# Patient Record
Sex: MALE | Race: WHITE | NOT HISPANIC OR LATINO | Employment: FULL TIME | URBAN - NONMETROPOLITAN AREA
[De-identification: names, ages, dates, MRNs, and addresses within clinical notes are randomized per-mention and may not be internally consistent; named-entity substitution may affect disease eponyms.]

---

## 2020-04-13 ENCOUNTER — OFFICE VISIT (OUTPATIENT)
Dept: URGENT CARE | Facility: PHYSICIAN GROUP | Age: 35
End: 2020-04-13
Payer: COMMERCIAL

## 2020-04-13 VITALS
DIASTOLIC BLOOD PRESSURE: 78 MMHG | RESPIRATION RATE: 16 BRPM | SYSTOLIC BLOOD PRESSURE: 128 MMHG | TEMPERATURE: 99.3 F | OXYGEN SATURATION: 95 % | HEART RATE: 95 BPM

## 2020-04-13 DIAGNOSIS — R05.9 COUGH: ICD-10-CM

## 2020-04-13 DIAGNOSIS — R50.9 FEVER, UNSPECIFIED FEVER CAUSE: ICD-10-CM

## 2020-04-13 LAB
FLUAV+FLUBV AG SPEC QL IA: NEGATIVE
INT CON NEG: NORMAL
INT CON NEG: NORMAL
INT CON POS: NORMAL
INT CON POS: NORMAL
S PYO AG THROAT QL: NEGATIVE

## 2020-04-13 PROCEDURE — 87804 INFLUENZA ASSAY W/OPTIC: CPT | Performed by: PHYSICIAN ASSISTANT

## 2020-04-13 PROCEDURE — 99203 OFFICE O/P NEW LOW 30 MIN: CPT | Performed by: PHYSICIAN ASSISTANT

## 2020-04-13 PROCEDURE — 87880 STREP A ASSAY W/OPTIC: CPT | Performed by: PHYSICIAN ASSISTANT

## 2020-04-13 RX ORDER — ALBUTEROL SULFATE 90 UG/1
2 AEROSOL, METERED RESPIRATORY (INHALATION) EVERY 6 HOURS PRN
Qty: 8.5 G | Refills: 0 | Status: SHIPPED | OUTPATIENT
Start: 2020-04-13 | End: 2023-02-07

## 2020-04-13 ASSESSMENT — ENCOUNTER SYMPTOMS
CHILLS: 1
SHORTNESS OF BREATH: 0
SPUTUM PRODUCTION: 1
ABDOMINAL PAIN: 0
MYALGIAS: 1
VOMITING: 0
WHEEZING: 0
HEADACHES: 1
DIARRHEA: 0
NAUSEA: 0
FEVER: 1
COUGH: 1
SORE THROAT: 0

## 2020-04-13 NOTE — PROGRESS NOTES
Subjective:      El Felix is a 35 y.o. male who presents with Fever (since Friday/ Pt took Tylenol this morning) and Cough (started this morning)            Fever    This is a new problem. Episode onset: 3 days. TMAX- 103F. The problem occurs intermittently. The problem has been waxing and waning. The maximum temperature noted was 103 to 103.9 F. Associated symptoms include coughing (mild cough this am), headaches, muscle aches and sleepiness. Pertinent negatives include no abdominal pain, chest pain, congestion, diarrhea, ear pain, nausea, rash, sore throat, vomiting or wheezing. He has tried acetaminophen for the symptoms. The treatment provided moderate relief.   Risk factors: sick contacts      The patient was exposed to someone at work who tested positive for COVID-19.      Past Medical History:   Diagnosis Date   • ASTHMA        History reviewed. No pertinent surgical history.    History reviewed. No pertinent family history.    Allergies   Allergen Reactions   • Other Food      coconut   • Pcn [Penicillins]        Medications, Allergies, and current problem list reviewed today in Epic      Review of Systems   Constitutional: Positive for chills, fever and malaise/fatigue.   HENT: Negative for congestion, ear pain and sore throat.    Respiratory: Positive for cough (mild cough this am) and sputum production (clear). Negative for shortness of breath and wheezing.    Cardiovascular: Negative for chest pain and leg swelling.   Gastrointestinal: Negative for abdominal pain, diarrhea, nausea and vomiting.   Musculoskeletal: Positive for myalgias.   Skin: Negative for rash.   Neurological: Positive for headaches.     All other systems reviewed and are negative.          Objective:     /78   Pulse 95   Temp 37.4 °C (99.3 °F) (Temporal)   Resp 16   SpO2 95%      Physical Exam  Constitutional:       General: He is not in acute distress.     Appearance: Normal appearance. He is not ill-appearing.    HENT:      Head: Normocephalic and atraumatic.      Nose: Nose normal.   Eyes:      Conjunctiva/sclera: Conjunctivae normal.   Neck:      Musculoskeletal: Neck supple.   Cardiovascular:      Rate and Rhythm: Normal rate.   Pulmonary:      Effort: Pulmonary effort is normal. No respiratory distress.   Musculoskeletal: Normal range of motion.   Neurological:      General: No focal deficit present.      Mental Status: He is alert and oriented to person, place, and time.   Psychiatric:         Mood and Affect: Mood normal.         Behavior: Behavior normal.         Thought Content: Thought content normal.         Judgment: Judgment normal.                 Assessment/Plan:       1. Fever, unspecified fever cause  POCT Rapid Strep A    POCT Influenza A/B   2. Cough  POCT Rapid Strep A    POCT Influenza A/B       Viral URI likely. Informed patient I am unable to test for COVID in this setting.  Rapid strep and Rapid flu- negative.  He is in contact with the health department,  Advised follow- up with them.  Per protocol for PUI/ISO patients, the patient was evaluated by me while I was wearing PPE.  Per CDC guidelines, patient has been instructed to self quarantine at home for at least 7 days from onset of symptoms and at least 3 full days after resolution of fever/respiratory symptoms.  PT verbalized agreement to do so.      Will send inhaler due to the patient's hx of asthma in case symptoms progress.  Vitals stable.     Differential diagnoses, Supportive care, and indications for immediate follow-up discussed with patient.   Instructed to return to clinic or nearest emergency department for any change in condition, further concerns, or worsening of symptoms.    The patient demonstrated a good understanding and agreed with the treatment plan.    Cindi Whitehead P.A.-C.

## 2023-02-07 ENCOUNTER — OFFICE VISIT (OUTPATIENT)
Dept: URGENT CARE | Facility: CLINIC | Age: 38
End: 2023-02-07
Payer: COMMERCIAL

## 2023-02-07 VITALS
BODY MASS INDEX: 35.61 KG/M2 | SYSTOLIC BLOOD PRESSURE: 110 MMHG | OXYGEN SATURATION: 92 % | TEMPERATURE: 97.4 F | HEIGHT: 68 IN | RESPIRATION RATE: 16 BRPM | WEIGHT: 235 LBS | HEART RATE: 98 BPM | DIASTOLIC BLOOD PRESSURE: 84 MMHG

## 2023-02-07 DIAGNOSIS — J01.40 ACUTE NON-RECURRENT PANSINUSITIS: ICD-10-CM

## 2023-02-07 DIAGNOSIS — R51.9 SCALP PAIN: ICD-10-CM

## 2023-02-07 PROCEDURE — 99213 OFFICE O/P EST LOW 20 MIN: CPT | Performed by: NURSE PRACTITIONER

## 2023-02-07 RX ORDER — DOXYCYCLINE HYCLATE 100 MG
100 TABLET ORAL 2 TIMES DAILY
Qty: 14 TABLET | Refills: 0 | Status: SHIPPED | OUTPATIENT
Start: 2023-02-07 | End: 2023-02-14

## 2023-02-07 ASSESSMENT — ENCOUNTER SYMPTOMS
COUGH: 0
FEVER: 0
CHILLS: 0
WHEEZING: 0
DIAPHORESIS: 0
SPUTUM PRODUCTION: 0
SORE THROAT: 0
SHORTNESS OF BREATH: 0
HEMOPTYSIS: 0
SINUS PAIN: 1

## 2023-02-07 NOTE — PROGRESS NOTES
"Subjective     El Felix is a 37 y.o. male who presents with Sinusitis ([id number] ), Bleeding/Bruising (Random ), and Cyst (On head, possibly causing head pressure )            El comes in today with a 2 month history of persistent nasal congestion, post nasal drainage and frontal sinus pressure.  He works as a  and feels worse when he makes frequent trips [place]  He has a history of sinusitis several times per year and has ever since he had a frontal head injury years ago.  No recent antibiotics in past 6 months.  He has tolerated doxycycline well historically.  He is allergic to PCN.  Additionally he notes a focal tender region on the right side of his scalp.  He denies any itching, rash or lesion.  He frequently massages there when he is feeling tired or stressed.    He has some intermittent bruising that he cannot recall any injury.  He wouldn't necessarily say he bruises easy, just that he notes bruises without recalling any trauma.  No known bleeding disorder.  He states he plans to establish with a PCP to discuss this matter.  He does not have any bruising today.  No weight loss or unexplained constitutional symptoms.      Review of Systems   Constitutional:  Positive for malaise/fatigue. Negative for chills, diaphoresis and fever.   HENT:  Positive for congestion and sinus pain. Negative for ear pain and sore throat.    Respiratory:  Negative for cough, hemoptysis, sputum production, shortness of breath and wheezing.    Skin:  Negative for itching and rash.      Medications, Allergies, and current problem list reviewed today in Epic      Objective     Blood Pressure 110/84   Pulse 98   Temperature 36.3 °C (97.4 °F) (Temporal)   Respiration 16   Height 1.727 m (5' 8\")   Weight 107 kg (235 lb)   Oxygen Saturation 92%   Body Mass Index 35.73 kg/m²      Physical Exam  Vitals reviewed.   Constitutional:       General: He is not in acute distress.   "   Appearance: Normal appearance. He is well-developed. He is not ill-appearing, toxic-appearing or diaphoretic.   HENT:      Head:        Comments: Focal mild TTP on the right side of the scalp with no bruising, erythema, rash or lesion.  5 mm x 7 mm raised area with no fluctuance.  No sharp margins or marble like shape.  No vesicles.  No bleeding, crusting, weeping or open wounds.     Right Ear: Tympanic membrane, ear canal and external ear normal. There is no impacted cerumen.      Left Ear: Tympanic membrane, ear canal and external ear normal. There is no impacted cerumen.      Nose: Congestion present. No rhinorrhea.      Comments: Frontal sinus TTP.     Mouth/Throat:      Mouth: Mucous membranes are moist.      Pharynx: No oropharyngeal exudate or posterior oropharyngeal erythema.      Comments: Post nasal drainage streaking noted.  Eyes:      General: No scleral icterus.        Right eye: No discharge.         Left eye: No discharge.      Conjunctiva/sclera: Conjunctivae normal.      Pupils: Pupils are equal, round, and reactive to light.   Neck:      Thyroid: No thyromegaly.      Vascular: No JVD.      Trachea: No tracheal deviation.   Cardiovascular:      Rate and Rhythm: Regular rhythm. Tachycardia present.      Heart sounds: Normal heart sounds. No murmur heard.    No friction rub. No gallop.   Pulmonary:      Effort: Pulmonary effort is normal. No respiratory distress.      Breath sounds: Normal breath sounds. No stridor. No wheezing, rhonchi or rales.   Chest:      Chest wall: No tenderness.   Musculoskeletal:      Cervical back: Neck supple.   Lymphadenopathy:      Cervical: No cervical adenopathy.   Skin:     General: Skin is warm and dry.   Neurological:      Mental Status: He is alert and oriented to person, place, and time.   Psychiatric:         Mood and Affect: Mood normal.                           Assessment & Plan        1. Acute non-recurrent pansinusitis  doxycycline (VIBRAMYCIN) 100 MG Tab       2. Scalp pain          Discussed exam findings with lE.    Take full course of antibiotics for sinusitis.  GI and photosensitivity precautions reviewed.  OTC oral decongestants prn symptom management.  Maintain adequate po hydration.  In regards to the focal scalp pain, etiology unclear.  No overt abscess or rash.  Possible small cyst?  Differential reviewed.  Avoid rubbing and follow up with PCP within 1 month for re-check.  In regards to the history of bruising, follow up with PCP to establish care for further evaluation.  ED precautions reviewed.    RTC in 7 days if acute symptoms persist, sooner if worse.  He verbalized understanding of and agreed with plan of care.

## 2023-02-20 ENCOUNTER — TELEPHONE (OUTPATIENT)
Dept: URGENT CARE | Facility: CLINIC | Age: 38
End: 2023-02-20

## 2023-04-18 ENCOUNTER — OFFICE VISIT (OUTPATIENT)
Dept: URGENT CARE | Facility: CLINIC | Age: 38
End: 2023-04-18
Payer: COMMERCIAL

## 2023-04-18 ENCOUNTER — TELEPHONE (OUTPATIENT)
Dept: SCHEDULING | Facility: IMAGING CENTER | Age: 38
End: 2023-04-18

## 2023-04-18 VITALS
DIASTOLIC BLOOD PRESSURE: 84 MMHG | BODY MASS INDEX: 36.75 KG/M2 | OXYGEN SATURATION: 96 % | SYSTOLIC BLOOD PRESSURE: 130 MMHG | HEART RATE: 76 BPM | RESPIRATION RATE: 14 BRPM | HEIGHT: 68 IN | TEMPERATURE: 98.7 F | WEIGHT: 242.51 LBS

## 2023-04-18 DIAGNOSIS — M25.472 SWELLING OF BOTH ANKLES: ICD-10-CM

## 2023-04-18 DIAGNOSIS — M25.471 SWELLING OF BOTH ANKLES: ICD-10-CM

## 2023-04-18 PROCEDURE — 99213 OFFICE O/P EST LOW 20 MIN: CPT

## 2023-04-18 ASSESSMENT — ENCOUNTER SYMPTOMS
COUGH: 0
FALLS: 0
FEVER: 0
SHORTNESS OF BREATH: 0

## 2023-04-18 NOTE — PROGRESS NOTES
"Subjective:     CHIEF COMPLAINT  Chief Complaint   Patient presents with    Leg Swelling     Pt concerned about lower extremity swelling/bruising.        HPI  El Felix is a very pleasant 38 y.o. male who presents with ankle swelling that he has noticed for the past 2 months. He does not recall any injury preceding the swelling, but has noted that his R medial ankle is more swollen than the left. He reports he broke that ankle in his 20s and was supposed to have reconstructive surgery but never did. He reports he sits for most of his work day. He denies any pain in ankles. No CP or SOB. Patient also states he recently started going back to the gym around the same time the swelling occurred. He has worn compression socks with some improvement in swelling.     REVIEW OF SYSTEMS  Review of Systems   Constitutional:  Negative for fever and malaise/fatigue.   Respiratory:  Negative for cough and shortness of breath.    Cardiovascular:  Negative for chest pain.   Musculoskeletal:  Negative for falls.     PAST MEDICAL HISTORY  There are no problems to display for this patient.      SURGICAL HISTORY  patient denies any surgical history    ALLERGIES  Allergies   Allergen Reactions    Other Food      coconut    Pcn [Penicillins]        CURRENT MEDICATIONS  Home Medications       Reviewed by Stephani Dela Cruz P.A.-C. (Physician Assistant) on 04/18/23 at 1125  Med List Status: <None>     Medication Last Dose Status        Patient Thom Taking any Medications                           SOCIAL HISTORY  Social History     Tobacco Use    Smoking status: Never    Smokeless tobacco: Not on file   Substance and Sexual Activity    Alcohol use: Yes     Comment: socially    Drug use: No    Sexual activity: Not on file       FAMILY HISTORY  History reviewed. No pertinent family history.       Objective:     VITAL SIGNS: /84   Pulse 76   Temp 37.1 °C (98.7 °F) (Temporal)   Resp 14   Ht 1.727 m (5' 8\")   Wt 110 kg " (242 lb 8.1 oz)   SpO2 96%   BMI 36.87 kg/m²     PHYSICAL EXAM  Physical Exam  Constitutional:       General: He is not in acute distress.     Appearance: Normal appearance. He is normal weight. He is not ill-appearing or toxic-appearing.   HENT:      Head: Normocephalic and atraumatic.      Mouth/Throat:      Mouth: Mucous membranes are moist.   Eyes:      Conjunctiva/sclera: Conjunctivae normal.   Cardiovascular:      Rate and Rhythm: Normal rate and regular rhythm.      Pulses:           Dorsalis pedis pulses are 2+ on the right side and 2+ on the left side.        Posterior tibial pulses are 2+ on the right side and 2+ on the left side.      Heart sounds: Normal heart sounds.   Pulmonary:      Effort: Pulmonary effort is normal. No respiratory distress.      Breath sounds: Normal breath sounds. No wheezing, rhonchi or rales.   Musculoskeletal:      Right foot: Normal range of motion.      Left foot: Normal range of motion.        Feet:    Feet:      Right foot:      Skin integrity: Skin integrity normal. No skin breakdown, erythema or warmth.      Toenail Condition: Right toenails are normal.      Left foot:      Skin integrity: Skin integrity normal.      Comments: Non-pitting edema localized to medial surface of R ankle. Some swelling noted on medial surface of L ankle. Non-tender to palpation. Normal sensation. Normal skin integrity.   Skin:     General: Skin is warm and dry.      Capillary Refill: Capillary refill takes less than 2 seconds.   Neurological:      General: No focal deficit present.      Mental Status: He is alert and oriented to person, place, and time.   Psychiatric:         Mood and Affect: Mood normal.       Assessment/Plan:     1. Swelling of both ankles  -Continue use of compression stockings  -See PCP for further management  -Elevate legs above heart level at end of day to aid swelling    MDM/Comments:  Patient has stable vital signs and is non-toxic appearing. Discussed supportive care  with compression stockings and elevation of legs. Swelling is quite localized to the medial surface of the R ankle, although there is a small amount of swelling to the L ankle. Patient has a fairly extensive history of past injuries to R ankle. I suspect swelling is due to chronic inflammation or perhaps arthritis. Patient's heart and lung sounds were all within normal limits. No concern for heart failure at this time. Patient demonstrated understanding of treatment plan and has made an appointment with his PCP for further management.     Differential diagnosis, natural history, supportive care, and indications for immediate follow-up discussed. All questions answered. Patient agrees with the plan of care.    Follow-up as needed if symptoms worsen or fail to improve to PCP, Urgent care or Emergency Room.    I have personally reviewed prior external notes and test results pertinent to today's visit.  I have independently reviewed and interpreted all diagnostics ordered during this urgent care acute visit.   Discussed management options (risks,benefits, and alternatives to treatment). Pt expresses understanding and the treatment plan was agreed upon. Questions were encouraged and answered to pt's satisfaction.    Please note that this dictation was created using voice recognition software. I have made a reasonable attempt to correct obvious errors, but I expect that there are errors of grammar and possibly content that I did not discover before finalizing the note.

## 2023-04-25 ENCOUNTER — OFFICE VISIT (OUTPATIENT)
Dept: MEDICAL GROUP | Facility: CLINIC | Age: 38
End: 2023-04-25
Payer: COMMERCIAL

## 2023-04-25 ENCOUNTER — HOSPITAL ENCOUNTER (OUTPATIENT)
Facility: MEDICAL CENTER | Age: 38
End: 2023-04-25
Attending: PHYSICIAN ASSISTANT
Payer: COMMERCIAL

## 2023-04-25 VITALS
DIASTOLIC BLOOD PRESSURE: 82 MMHG | BODY MASS INDEX: 36.43 KG/M2 | TEMPERATURE: 97.6 F | RESPIRATION RATE: 16 BRPM | WEIGHT: 240.4 LBS | HEIGHT: 68 IN | OXYGEN SATURATION: 96 % | SYSTOLIC BLOOD PRESSURE: 124 MMHG | HEART RATE: 81 BPM

## 2023-04-25 DIAGNOSIS — E66.09 CLASS 2 OBESITY DUE TO EXCESS CALORIES WITHOUT SERIOUS COMORBIDITY WITH BODY MASS INDEX (BMI) OF 36.0 TO 36.9 IN ADULT: ICD-10-CM

## 2023-04-25 DIAGNOSIS — M25.471 RIGHT ANKLE SWELLING: ICD-10-CM

## 2023-04-25 DIAGNOSIS — Z13.21 ENCOUNTER FOR VITAMIN DEFICIENCY SCREENING: ICD-10-CM

## 2023-04-25 DIAGNOSIS — Z11.59 NEED FOR HEPATITIS C SCREENING TEST: ICD-10-CM

## 2023-04-25 DIAGNOSIS — S99.921S INJURY OF RIGHT FOOT, SEQUELA: ICD-10-CM

## 2023-04-25 DIAGNOSIS — R53.83 OTHER FATIGUE: ICD-10-CM

## 2023-04-25 DIAGNOSIS — R06.02 SHORTNESS OF BREATH: ICD-10-CM

## 2023-04-25 DIAGNOSIS — S99.911S INJURY OF RIGHT ANKLE, SEQUELA: ICD-10-CM

## 2023-04-25 DIAGNOSIS — R07.89 OTHER CHEST PAIN: ICD-10-CM

## 2023-04-25 DIAGNOSIS — G43.819 OTHER MIGRAINE WITHOUT STATUS MIGRAINOSUS, INTRACTABLE: ICD-10-CM

## 2023-04-25 PROBLEM — G43.909 MIGRAINE: Status: ACTIVE | Noted: 2023-04-25

## 2023-04-25 PROBLEM — S99.921A INJURY OF RIGHT FOOT: Status: ACTIVE | Noted: 2023-04-25

## 2023-04-25 PROBLEM — S99.911A INJURY OF RIGHT ANKLE: Status: ACTIVE | Noted: 2023-04-25

## 2023-04-25 LAB
25(OH)D3 SERPL-MCNC: 25 NG/ML (ref 30–100)
ALBUMIN SERPL BCP-MCNC: 4.4 G/DL (ref 3.2–4.9)
ALBUMIN/GLOB SERPL: 1.4 G/DL
ALP SERPL-CCNC: 60 U/L (ref 30–99)
ALT SERPL-CCNC: 37 U/L (ref 2–50)
ANION GAP SERPL CALC-SCNC: 12 MMOL/L (ref 7–16)
AST SERPL-CCNC: 24 U/L (ref 12–45)
BILIRUB SERPL-MCNC: 0.4 MG/DL (ref 0.1–1.5)
BUN SERPL-MCNC: 13 MG/DL (ref 8–22)
CALCIUM ALBUM COR SERPL-MCNC: 8.8 MG/DL (ref 8.5–10.5)
CALCIUM SERPL-MCNC: 9.1 MG/DL (ref 8.5–10.5)
CHLORIDE SERPL-SCNC: 102 MMOL/L (ref 96–112)
CHOLEST SERPL-MCNC: 177 MG/DL (ref 100–199)
CO2 SERPL-SCNC: 25 MMOL/L (ref 20–33)
CREAT SERPL-MCNC: 0.81 MG/DL (ref 0.5–1.4)
GFR SERPLBLD CREATININE-BSD FMLA CKD-EPI: 116 ML/MIN/1.73 M 2
GLOBULIN SER CALC-MCNC: 3.2 G/DL (ref 1.9–3.5)
GLUCOSE SERPL-MCNC: 90 MG/DL (ref 65–99)
HCV AB SER QL: NORMAL
HDLC SERPL-MCNC: 35 MG/DL
LDLC SERPL CALC-MCNC: 125 MG/DL
POTASSIUM SERPL-SCNC: 4.4 MMOL/L (ref 3.6–5.5)
PROT SERPL-MCNC: 7.6 G/DL (ref 6–8.2)
SODIUM SERPL-SCNC: 139 MMOL/L (ref 135–145)
TRIGL SERPL-MCNC: 85 MG/DL (ref 0–149)
TSH SERPL DL<=0.005 MIU/L-ACNC: 2.2 UIU/ML (ref 0.38–5.33)

## 2023-04-25 PROCEDURE — 99214 OFFICE O/P EST MOD 30 MIN: CPT | Performed by: PHYSICIAN ASSISTANT

## 2023-04-25 PROCEDURE — 84443 ASSAY THYROID STIM HORMONE: CPT

## 2023-04-25 PROCEDURE — 93000 ELECTROCARDIOGRAM COMPLETE: CPT | Performed by: PHYSICIAN ASSISTANT

## 2023-04-25 PROCEDURE — 82306 VITAMIN D 25 HYDROXY: CPT

## 2023-04-25 PROCEDURE — 80061 LIPID PANEL: CPT

## 2023-04-25 PROCEDURE — 86803 HEPATITIS C AB TEST: CPT

## 2023-04-25 PROCEDURE — 80053 COMPREHEN METABOLIC PANEL: CPT

## 2023-04-25 RX ORDER — ALBUTEROL SULFATE 90 UG/1
2 AEROSOL, METERED RESPIRATORY (INHALATION) EVERY 4 HOURS PRN
Qty: 1 EACH | Refills: 2 | Status: SHIPPED | OUTPATIENT
Start: 2023-04-25

## 2023-04-25 SDOH — ECONOMIC STABILITY: INCOME INSECURITY: HOW HARD IS IT FOR YOU TO PAY FOR THE VERY BASICS LIKE FOOD, HOUSING, MEDICAL CARE, AND HEATING?: PATIENT DECLINED

## 2023-04-25 SDOH — ECONOMIC STABILITY: INCOME INSECURITY: IN THE LAST 12 MONTHS, WAS THERE A TIME WHEN YOU WERE NOT ABLE TO PAY THE MORTGAGE OR RENT ON TIME?: PATIENT REFUSED

## 2023-04-25 SDOH — ECONOMIC STABILITY: HOUSING INSECURITY
IN THE LAST 12 MONTHS, WAS THERE A TIME WHEN YOU DID NOT HAVE A STEADY PLACE TO SLEEP OR SLEPT IN A SHELTER (INCLUDING NOW)?: PATIENT REFUSED

## 2023-04-25 SDOH — HEALTH STABILITY: PHYSICAL HEALTH: ON AVERAGE, HOW MANY DAYS PER WEEK DO YOU ENGAGE IN MODERATE TO STRENUOUS EXERCISE (LIKE A BRISK WALK)?: 4 DAYS

## 2023-04-25 SDOH — ECONOMIC STABILITY: TRANSPORTATION INSECURITY
IN THE PAST 12 MONTHS, HAS LACK OF TRANSPORTATION KEPT YOU FROM MEETINGS, WORK, OR FROM GETTING THINGS NEEDED FOR DAILY LIVING?: PATIENT DECLINED

## 2023-04-25 SDOH — ECONOMIC STABILITY: FOOD INSECURITY: WITHIN THE PAST 12 MONTHS, YOU WORRIED THAT YOUR FOOD WOULD RUN OUT BEFORE YOU GOT MONEY TO BUY MORE.: PATIENT DECLINED

## 2023-04-25 SDOH — HEALTH STABILITY: PHYSICAL HEALTH: ON AVERAGE, HOW MANY MINUTES DO YOU ENGAGE IN EXERCISE AT THIS LEVEL?: 30 MIN

## 2023-04-25 SDOH — ECONOMIC STABILITY: HOUSING INSECURITY

## 2023-04-25 SDOH — ECONOMIC STABILITY: TRANSPORTATION INSECURITY
IN THE PAST 12 MONTHS, HAS THE LACK OF TRANSPORTATION KEPT YOU FROM MEDICAL APPOINTMENTS OR FROM GETTING MEDICATIONS?: PATIENT DECLINED

## 2023-04-25 SDOH — ECONOMIC STABILITY: FOOD INSECURITY: WITHIN THE PAST 12 MONTHS, THE FOOD YOU BOUGHT JUST DIDN'T LAST AND YOU DIDN'T HAVE MONEY TO GET MORE.: PATIENT DECLINED

## 2023-04-25 SDOH — HEALTH STABILITY: MENTAL HEALTH
STRESS IS WHEN SOMEONE FEELS TENSE, NERVOUS, ANXIOUS, OR CAN'T SLEEP AT NIGHT BECAUSE THEIR MIND IS TROUBLED. HOW STRESSED ARE YOU?: TO SOME EXTENT

## 2023-04-25 SDOH — ECONOMIC STABILITY: TRANSPORTATION INSECURITY
IN THE PAST 12 MONTHS, HAS LACK OF RELIABLE TRANSPORTATION KEPT YOU FROM MEDICAL APPOINTMENTS, MEETINGS, WORK OR FROM GETTING THINGS NEEDED FOR DAILY LIVING?: PATIENT DECLINED

## 2023-04-25 ASSESSMENT — LIFESTYLE VARIABLES
HOW OFTEN DO YOU HAVE A DRINK CONTAINING ALCOHOL: PATIENT DECLINED
SKIP TO QUESTIONS 9-10: 0
AUDIT-C TOTAL SCORE: -1
HOW OFTEN DO YOU HAVE SIX OR MORE DRINKS ON ONE OCCASION: PATIENT DECLINED
HOW MANY STANDARD DRINKS CONTAINING ALCOHOL DO YOU HAVE ON A TYPICAL DAY: PATIENT DECLINED

## 2023-04-25 ASSESSMENT — SOCIAL DETERMINANTS OF HEALTH (SDOH)
HOW OFTEN DO YOU ATTEND CHURCH OR RELIGIOUS SERVICES?: MORE THAN 4 TIMES PER YEAR
HOW OFTEN DO YOU HAVE A DRINK CONTAINING ALCOHOL: PATIENT DECLINED
DO YOU BELONG TO ANY CLUBS OR ORGANIZATIONS SUCH AS CHURCH GROUPS UNIONS, FRATERNAL OR ATHLETIC GROUPS, OR SCHOOL GROUPS?: NO
HOW OFTEN DO YOU GET TOGETHER WITH FRIENDS OR RELATIVES?: THREE TIMES A WEEK
HOW OFTEN DO YOU ATTENT MEETINGS OF THE CLUB OR ORGANIZATION YOU BELONG TO?: NEVER
WITHIN THE PAST 12 MONTHS, YOU WORRIED THAT YOUR FOOD WOULD RUN OUT BEFORE YOU GOT THE MONEY TO BUY MORE: PATIENT DECLINED
HOW OFTEN DO YOU GET TOGETHER WITH FRIENDS OR RELATIVES?: THREE TIMES A WEEK
IN A TYPICAL WEEK, HOW MANY TIMES DO YOU TALK ON THE PHONE WITH FAMILY, FRIENDS, OR NEIGHBORS?: MORE THAN THREE TIMES A WEEK
HOW OFTEN DO YOU ATTEND CHURCH OR RELIGIOUS SERVICES?: MORE THAN 4 TIMES PER YEAR
HOW MANY DRINKS CONTAINING ALCOHOL DO YOU HAVE ON A TYPICAL DAY WHEN YOU ARE DRINKING: PATIENT DECLINED
HOW OFTEN DO YOU HAVE SIX OR MORE DRINKS ON ONE OCCASION: PATIENT DECLINED
HOW OFTEN DO YOU ATTENT MEETINGS OF THE CLUB OR ORGANIZATION YOU BELONG TO?: NEVER
DO YOU BELONG TO ANY CLUBS OR ORGANIZATIONS SUCH AS CHURCH GROUPS UNIONS, FRATERNAL OR ATHLETIC GROUPS, OR SCHOOL GROUPS?: NO
IN A TYPICAL WEEK, HOW MANY TIMES DO YOU TALK ON THE PHONE WITH FAMILY, FRIENDS, OR NEIGHBORS?: MORE THAN THREE TIMES A WEEK
HOW HARD IS IT FOR YOU TO PAY FOR THE VERY BASICS LIKE FOOD, HOUSING, MEDICAL CARE, AND HEATING?: PATIENT DECLINED

## 2023-04-25 ASSESSMENT — PATIENT HEALTH QUESTIONNAIRE - PHQ9: CLINICAL INTERPRETATION OF PHQ2 SCORE: 0

## 2023-04-25 NOTE — PROGRESS NOTES
"cc:  migraines    Subjective:     El Felix is a 38 y.o. male presenting for migraines      Patient presents to the office for migraines.  He states that he has been having 1-2 times a month and has been experiencing for about 2 years.  He states that he has felt fatigue.      Patient states that he has been having joint swelling and pain throughout the day.  He indicates an injury to most joints.  He does indicate being short of breath and fatigue.   He states that he has been having chest pain and short of breath.  He states that it feels like lung.  He indicates being diagnosed with asthma as a child.  He states that he has a history of seasonal allergies.     Patient states that he is having swelling of the right internal  ankle.  He states that there was a previous fracture of the area. He also complains of previous injury to the right great toe.  He denies pain.    Review of systems:  See above.   Denies any symptoms unless previously indicated.        Current Outpatient Medications:     albuterol 108 (90 Base) MCG/ACT Aero Soln inhalation aerosol, Inhale 2 Puffs every four hours as needed for Shortness of Breath., Disp: 1 Each, Rfl: 2    Allergies, past medical history, past surgical history, family history, social history reviewed and updated    Objective:     Vitals: /82 (BP Location: Left arm, Patient Position: Sitting, BP Cuff Size: Adult)   Pulse 81   Temp 36.4 °C (97.6 °F) (Temporal)   Resp 16   Ht 1.727 m (5' 8\")   Wt 109 kg (240 lb 6.4 oz)   SpO2 96%   BMI 36.55 kg/m²   General: Alert, pleasant, NAD  EYES:   PERRL, EOMI, no icterus or pallor.  Conjunctivae and lids normal.   HENT:  Normocephalic.  External ears normal. Neck supple  Heart: Regular rate and rhythm.  S1 and S2 normal.  No murmurs appreciated.  Respiratory: Normal respiratory effort.  Clear to auscultation bilaterally.decreased breath sounds in the bases.    Abdomen: obese  Skin: Warm, dry, no " rashes.  Musculoskeletal: Gait is normal.  Moves all extremities well.    Extremities: normal range of motion all extremities.  Mild swelling of the medial right ankle.  Neurological: No tremors, sensation grossly intact, patellar reflexes 2+ symmetric, tone/strength normal, gait is normal, CN2-12 intact.  Psych:  Affect/mood is normal, judgement is good, memory is intact, grooming is appropriate.    EKG:  normal sinus, normal rhythm with no st elevation or depression.      Assessment/Plan:     El Bello was seen today for establish care and migraine.    Diagnoses and all orders for this visit:    Other migraine without status migrainosus, intractable    Although patient does have this complaint, there are other concerns today that took priority.  We will discuss this at follow-up.    Other chest pain  -     EKG - Clinic Performed  -     DX-CHEST-2 VIEWS; Future  -     albuterol 108 (90 Base) MCG/ACT Aero Soln inhalation aerosol; Inhale 2 Puffs every four hours as needed for Shortness of Breath.  Shortness of breath  -     EKG - Clinic Performed  -     DX-CHEST-2 VIEWS; Future  -     albuterol 108 (90 Base) MCG/ACT Aero Soln inhalation aerosol; Inhale 2 Puffs every four hours as needed for Shortness of Breath.    We will try patient on albuterol inhaler as needed.  He has used inhalers in the past.  EKG is normal.  We will obtain a chest x-ray to evaluate further and follow-up in 2 weeks.    Class 2 obesity due to excess calories without serious comorbidity with body mass index (BMI) of 36.0 to 36.9 in adult  -     Lipid Profile; Future  -     Comp Metabolic Panel; Future  -     TSH WITH REFLEX TO FT4; Future  Other fatigue  -     Comp Metabolic Panel; Future  -     TSH WITH REFLEX TO FT4; Future  Encounter for vitamin deficiency screening  -     VITAMIN D,25 HYDROXY (DEFICIENCY); Future  Need for hepatitis C screening test  -     HEP C VIRUS ANTIBODY; Future    Routine lab work is been ordered to evaluate  further.  Follow-up in 2 weeks with test results.    Right ankle swelling  -     DX-ANKLE 3+ VIEWS RIGHT; Future  Injury of right ankle, sequela  -     DX-ANKLE 3+ VIEWS RIGHT; Future  Injury of right foot, sequela  -     DX-FOOT-COMPLETE 3+ RIGHT; Future      We will obtain x-rays to evaluate further.      Return in about 2 weeks (around 5/9/2023), or if symptoms worsen or fail to improve, for labs migraines.    Please note that this dictation was created using voice recognition software. I have made every reasonable attempt to correct obvious errors, but expect that there are errors of grammar and possible content that I did not discover before finalizing note.

## 2023-05-03 ENCOUNTER — APPOINTMENT (OUTPATIENT)
Dept: RADIOLOGY | Facility: MEDICAL CENTER | Age: 38
End: 2023-05-03
Attending: PHYSICIAN ASSISTANT
Payer: COMMERCIAL

## 2023-05-06 ENCOUNTER — APPOINTMENT (OUTPATIENT)
Dept: RADIOLOGY | Facility: IMAGING CENTER | Age: 38
End: 2023-05-06
Attending: PHYSICIAN ASSISTANT
Payer: COMMERCIAL

## 2023-05-06 ENCOUNTER — NON-PROVIDER VISIT (OUTPATIENT)
Dept: URGENT CARE | Facility: PHYSICIAN GROUP | Age: 38
End: 2023-05-06
Payer: COMMERCIAL

## 2023-05-06 DIAGNOSIS — S99.921S INJURY OF RIGHT FOOT, SEQUELA: ICD-10-CM

## 2023-05-06 DIAGNOSIS — R06.02 SHORTNESS OF BREATH: ICD-10-CM

## 2023-05-06 DIAGNOSIS — R07.89 OTHER CHEST PAIN: ICD-10-CM

## 2023-05-06 DIAGNOSIS — S99.911S INJURY OF RIGHT ANKLE, SEQUELA: ICD-10-CM

## 2023-05-06 DIAGNOSIS — M25.471 RIGHT ANKLE SWELLING: ICD-10-CM

## 2023-05-06 PROCEDURE — 73610 X-RAY EXAM OF ANKLE: CPT | Mod: TC,FY,RT | Performed by: NURSE PRACTITIONER

## 2023-05-06 PROCEDURE — 71046 X-RAY EXAM CHEST 2 VIEWS: CPT | Mod: TC,FY | Performed by: NURSE PRACTITIONER

## 2023-05-06 PROCEDURE — 73630 X-RAY EXAM OF FOOT: CPT | Mod: TC,FY,RT | Performed by: NURSE PRACTITIONER

## 2023-05-08 ENCOUNTER — OFFICE VISIT (OUTPATIENT)
Dept: MEDICAL GROUP | Facility: PHYSICIAN GROUP | Age: 38
End: 2023-05-08
Payer: COMMERCIAL

## 2023-05-08 VITALS
WEIGHT: 239.6 LBS | HEIGHT: 69 IN | SYSTOLIC BLOOD PRESSURE: 112 MMHG | DIASTOLIC BLOOD PRESSURE: 78 MMHG | RESPIRATION RATE: 16 BRPM | HEART RATE: 84 BPM | OXYGEN SATURATION: 95 % | BODY MASS INDEX: 35.49 KG/M2 | TEMPERATURE: 97.3 F

## 2023-05-08 DIAGNOSIS — M25.562 CHRONIC PAIN OF LEFT KNEE: ICD-10-CM

## 2023-05-08 DIAGNOSIS — G89.29 CHRONIC PAIN OF LEFT KNEE: ICD-10-CM

## 2023-05-08 DIAGNOSIS — S99.911S INJURY OF RIGHT ANKLE, SEQUELA: ICD-10-CM

## 2023-05-08 DIAGNOSIS — R22.0 HEAD LUMP: ICD-10-CM

## 2023-05-08 DIAGNOSIS — E78.49 OTHER HYPERLIPIDEMIA: ICD-10-CM

## 2023-05-08 DIAGNOSIS — E55.9 VITAMIN D DEFICIENCY: ICD-10-CM

## 2023-05-08 DIAGNOSIS — E66.09 CLASS 2 OBESITY DUE TO EXCESS CALORIES WITHOUT SERIOUS COMORBIDITY WITH BODY MASS INDEX (BMI) OF 35.0 TO 35.9 IN ADULT: ICD-10-CM

## 2023-05-08 DIAGNOSIS — G43.819 OTHER MIGRAINE WITHOUT STATUS MIGRAINOSUS, INTRACTABLE: ICD-10-CM

## 2023-05-08 DIAGNOSIS — S99.921S INJURY OF RIGHT FOOT, SEQUELA: ICD-10-CM

## 2023-05-08 DIAGNOSIS — R07.89 OTHER CHEST PAIN: ICD-10-CM

## 2023-05-08 DIAGNOSIS — M20.31 VARUS DEFORMITY OF RIGHT GREAT TOE: ICD-10-CM

## 2023-05-08 PROCEDURE — 99214 OFFICE O/P EST MOD 30 MIN: CPT | Performed by: PHYSICIAN ASSISTANT

## 2023-05-08 NOTE — PROGRESS NOTES
cc:  chest pain    Subjective:     El Felix is a 38 y.o. male presenting for chest pain      Patient presents to the office for chest pain.  Patient states that he could not afford the inhaler.  He states that the pain is in the mid chest.  He feels that it may be more musculoskeletal and states that it has not stopped him from working out.   He states that the pain in the chest comes and goes.      Patient states that he is still having pain.  He states that he is not having pain in the ankle and foot but it is more in the left knee.  The pain is a bit better than it has been in the last couple of weeks.       Patient indicates that he is still having migraines. He states that the headaches come and go.  He does not feel that they are severe, but that can be annoying. He states that he has never been on medication.  He states that it can be every other week.  He states that it is usually behind his right eye.  He will rub his temple when it occurs and found the lump.  He will see white spots and it is worse in the heat or with exhaustion. He declines medication.  It usually strikes mid shift tuesdays and wednesdays.      Patient states that he has a swollen lymph node in the left temple.  He states that it is still painful and swollen. [facility]  He feels that the lump is bigger and wider but less pronounced.   It is only painful when he touches it or if he has headaches. He states that the lump has been there for a year and a half.      Patient is also here to follow up with his most recent test results which do show hyperlipidemia and a low vitamin D level.  Patient is also obese and indicates that he has been working both with the Mediterranean diet and the keto diet.  Currently he is beginning the keto diet but has tried Mediterranean diet in the past and has done okay with this.  He also indicates that he has been indoor much of the winter f due to eeling ill.  Feels this  "is the reason that his vitamin D may be low.    He continues to have right foot pain and ankle pain but states that the swelling has been better as he is able to get his foot in his shoe.  He states that he had a repeat stress fracture in his foot previously.  He did play college football.      Patient states that he has been having sinus congestion for several months.  He is also wanting allergy testing to see if he is truly allergic to penicillin.      Review of systems:  See above.   Denies any symptoms unless previously indicated.        Current Outpatient Medications:     albuterol 108 (90 Base) MCG/ACT Aero Soln inhalation aerosol, Inhale 2 Puffs every four hours as needed for Shortness of Breath. (Patient not taking: Reported on 5/8/2023), Disp: 1 Each, Rfl: 2    Allergies, past medical history, past surgical history, family history, social history reviewed and updated    Objective:     Vitals: /78 (BP Location: Right arm, Patient Position: Sitting, BP Cuff Size: Adult)   Pulse 84   Temp 36.3 °C (97.3 °F) (Temporal)   Resp 16   Ht 1.74 m (5' 8.5\")   Wt 109 kg (239 lb 9.6 oz)   SpO2 95%   BMI 35.90 kg/m²   General: Alert, pleasant, NAD  EYES:   PERRL, EOMI, no icterus or pallor.  Conjunctivae and lids normal.   HENT:  Normocephalic.  External ears normal.  There is a mildly palpable lump anterior to the temple.  It is somewhat vague but does feel linear and patient reports pain with palpation.  Neck supple.     Respiratory: Normal respiratory effort.    Abdomen: Not obese  Skin: Warm, dry, no rashes.  Musculoskeletal: Gait is normal.  Moves all extremities well.    Extremities: normal range of motion all extremities.   Neurological: No tremors, sensation grossly intact, CN2-12 intact.  Psych:  Affect/mood is normal, judgement is good, memory is intact, grooming is appropriate.     Latest Reference Range & Units 04/25/23 09:05   Sodium 135 - 145 mmol/L 139   Potassium 3.6 - 5.5 mmol/L 4.4 "   Chloride 96 - 112 mmol/L 102   Co2 20 - 33 mmol/L 25   Anion Gap 7.0 - 16.0  12.0   Glucose 65 - 99 mg/dL 90   Bun 8 - 22 mg/dL 13   Creatinine 0.50 - 1.40 mg/dL 0.81   GFR (CKD-EPI) >60 mL/min/1.73 m 2 116   Calcium 8.5 - 10.5 mg/dL 9.1   Correct Calcium 8.5 - 10.5 mg/dL 8.8   AST(SGOT) 12 - 45 U/L 24   ALT(SGPT) 2 - 50 U/L 37   Alkaline Phosphatase 30 - 99 U/L 60   Total Bilirubin 0.1 - 1.5 mg/dL 0.4   Albumin 3.2 - 4.9 g/dL 4.4   Total Protein 6.0 - 8.2 g/dL 7.6   Globulin 1.9 - 3.5 g/dL 3.2   A-G Ratio g/dL 1.4   Cholesterol,Tot 100 - 199 mg/dL 177   Triglycerides 0 - 149 mg/dL 85   HDL >=40 mg/dL 35 !   LDL <100 mg/dL 125 (H)   25-Hydroxy   Vitamin D 25 30 - 100 ng/mL 25 (L)   TSH 0.380 - 5.330 uIU/mL 2.200   Hepatitis C Antibody Non-Reactive  Non-Reactive   !: Data is abnormal  (H): Data is abnormally high  (L): Data is abnormally low    HISTORY/REASON FOR EXAM:  chest pain, short of breath; Chest Pain.     COMPARISON:  None available.     FINDINGS:  The lungs are clear. No consolidation, edema, effusion, or pneumothorax is noted. Cardiac silhouette is within normal limits in size and unremarkable. Osseous structures and soft tissues are unremarkable.     IMPRESSION:        No acute cardiopulmonary process.    HISTORY/REASON FOR EXAM:  Atraumatic Pain/Swelling/Deformity; history of ankle fracture..     TECHNIQUE/EXAM DESCRIPTION AND NUMBER OF VIEWS:  3 views of the RIGHT ankle.     COMPARISON: None available.     FINDINGS:     The ankle mortise appears intact. No acute fracture is noted. No osteochondral lesion is identified within the talar dome. No aggressive osseous lesion is noted. There are no erosive changes. No foreign body or soft tissue defect is identified.     IMPRESSION:        Unremarkable right ankle radiographs.    FINDINGS:     Hallux valgus deformity of the great toe is noted. There is no acute fracture. There are no erosive changes. Joint spaces are well-maintained. There is no foreign body  or soft tissue defect.     IMPRESSION:        Hallux valgus deformity of the great toe with no acute fracture or erosive changes noted.    Assessment/Plan:     El Belol was seen today for results.    Diagnoses and all orders for this visit:    Other chest pain    Etiology of this is still unknown.  Patient unable to  inhaler.  I still recommend trying an inhaler although chest x-ray is negative.  Patient indicates that if he has this opportunity he will try this and see if symptoms improve.    Varus deformity of right great toe  -     Referral to Podiatry  Injury of right ankle, sequela  -     Referral to Podiatry  Injury of right foot, sequela  -     Referral to Podiatry    We will submit a referral to podiatry for further evaluation.    Other migraine without status migrainosus, intractable  -     Referral to Neurology    Declines triptan therapy.  We will submit a referral to neurology.    Head lump  -     US-SOFT TISSUES OF HEAD - NECK; Future    Unclear as to what this may be.  We will try an ultrasound.  If ultrasound is deemed poor modality by radiology or unable to obtain clear imaging, will consider CT.    Other hyperlipidemia  -     Lipid Profile; Future  -     Comp Metabolic Panel; Future  Vitamin D deficiency  -     VITAMIN D,25 HYDROXY (DEFICIENCY); Future  Class 2 obesity due to excess calories without serious comorbidity with body mass index (BMI) of 35.0 to 35.9 in adult  -     Lipid Profile; Future  -     Comp Metabolic Panel; Future    Patient can proceed with keto.  We will continue to monitor labs and repeat these in the next 4 to 6 months.    Chronic pain of left knee  -     DX-KNEE 3 VIEWS LEFT; Future    X-rays of the knee ordered to evaluate further.    Patient still has further concerns including allergies and sinuses.  We will make a follow-up for 1 to 2 weeks to discuss this further and if he has x-rays to follow-up with the knee.  As of note, patient does indicate injuring  his knee at work but he did not make his employer known of the injury until approximately 3 months later.  Therefore occupational/Workmen's Comp. denied due to lateness of claim.      Return in about 1 week (around 5/15/2023) for 1-2 weeks.sinuses.    Please note that this dictation was created using voice recognition software. I have made every reasonable attempt to correct obvious errors, but expect that there are errors of grammar and possible content that I did not discover before finalizing note.

## 2023-05-12 ENCOUNTER — TELEPHONE (OUTPATIENT)
Dept: HEALTH INFORMATION MANAGEMENT | Facility: OTHER | Age: 38
End: 2023-05-12

## 2023-05-30 ENCOUNTER — HOSPITAL ENCOUNTER (OUTPATIENT)
Dept: RADIOLOGY | Facility: MEDICAL CENTER | Age: 38
End: 2023-05-30
Attending: PHYSICIAN ASSISTANT
Payer: COMMERCIAL

## 2023-08-28 ENCOUNTER — APPOINTMENT (OUTPATIENT)
Dept: MEDICAL GROUP | Facility: CLINIC | Age: 38
End: 2023-08-28
Payer: COMMERCIAL

## 2023-10-11 ENCOUNTER — OFFICE VISIT (OUTPATIENT)
Dept: MEDICAL GROUP | Facility: CLINIC | Age: 38
End: 2023-10-11
Payer: COMMERCIAL

## 2023-10-11 VITALS
BODY MASS INDEX: 32.84 KG/M2 | OXYGEN SATURATION: 94 % | DIASTOLIC BLOOD PRESSURE: 78 MMHG | HEIGHT: 67 IN | TEMPERATURE: 98.1 F | RESPIRATION RATE: 16 BRPM | SYSTOLIC BLOOD PRESSURE: 110 MMHG | HEART RATE: 71 BPM | WEIGHT: 209.22 LBS

## 2023-10-11 DIAGNOSIS — R22.0 HEAD LUMP: ICD-10-CM

## 2023-10-11 DIAGNOSIS — E55.9 VITAMIN D DEFICIENCY: ICD-10-CM

## 2023-10-11 DIAGNOSIS — G43.819 OTHER MIGRAINE WITHOUT STATUS MIGRAINOSUS, INTRACTABLE: ICD-10-CM

## 2023-10-11 DIAGNOSIS — R53.83 OTHER FATIGUE: ICD-10-CM

## 2023-10-11 DIAGNOSIS — I73.00 RAYNAUD'S DISEASE WITHOUT GANGRENE: ICD-10-CM

## 2023-10-11 DIAGNOSIS — E78.49 OTHER HYPERLIPIDEMIA: ICD-10-CM

## 2023-10-11 PROCEDURE — 3074F SYST BP LT 130 MM HG: CPT | Performed by: PHYSICIAN ASSISTANT

## 2023-10-11 PROCEDURE — 3078F DIAST BP <80 MM HG: CPT | Performed by: PHYSICIAN ASSISTANT

## 2023-10-11 PROCEDURE — 99214 OFFICE O/P EST MOD 30 MIN: CPT | Performed by: PHYSICIAN ASSISTANT

## 2023-10-11 NOTE — PROGRESS NOTES
"cc:  request  testosterone    Subjective:     El Felix is a 38 y.o. male presenting for request testosterone      Patient presents to the office for request testosterone.  Patient states that he is having increased fatigue.  He is also having glow libido.  He feels that he is losing muscle mass.   Ye states that this has been going on for about a year.   Patient is not sure if he snores.      Patient states that he has had improvement in his headaches but still has a palpable lump.  He states that he has not had the opportunity to have the ultrasound done.  His headaches are usually behind the left eye .  It can cause vomiting.  Light will make headaches worse. He states that he has not had a migraine for 3-4 months.      Patient states that he has been having tingling of finger tips and toes.  He does have cracking of the fingers, but acknowledges blisters of his feet.     Review of systems:  See above.   Denies any symptoms unless previously indicated.        Current Outpatient Medications:     albuterol 108 (90 Base) MCG/ACT Aero Soln inhalation aerosol, Inhale 2 Puffs every four hours as needed for Shortness of Breath. (Patient not taking: Reported on 5/8/2023), Disp: 1 Each, Rfl: 2    Allergies, past medical history, past surgical history, family history, social history reviewed and updated    Objective:     Vitals: /78 (BP Location: Left arm, Patient Position: Sitting, BP Cuff Size: Adult)   Pulse 71   Temp 36.7 °C (98.1 °F) (Temporal)   Resp 16   Ht 1.702 m (5' 7\")   Wt 94.9 kg (209 lb 3.5 oz)   SpO2 94%   BMI 32.77 kg/m²   General: Alert, pleasant, NAD  EYES:   PERRL, EOMI, no icterus or pallor.  Conjunctivae and lids normal.   HENT:  Normocephalic.  External ears normal.  Lump right side of head  Respiratory: Normal respiratory effort.    Abdomen: obese  Skin: Warm, dry, no rashes.  Musculoskeletal: Gait is normal.  Moves all extremities well.    Extremities: normal range of " motion all extremities.  Picture of toes turning white.   Neurological: No tremors, sensation grossly intact,  gait is normal, CN2-12 intact.  Psych:  Affect/mood is normal, judgement is good, memory is intact, grooming is appropriate.    Assessment/Plan:     El Bello was seen today for requesting labs.    Diagnoses and all orders for this visit:    Other fatigue  -     Testosterone, Free & Total, Adult Male (w/SHBG); Future    We will obtain labs to evaluate testosterone levels.  Follow-up in 6 to 8 weeks.    Other hyperlipidemia  -     Lipid Profile; Future  -     Comp Metabolic Panel; Future  Vitamin D deficiency  -     VITAMIN D,25 HYDROXY (DEFICIENCY); Future    Lab work is been ordered to evaluate further.  Follow-up in 6 to 8 weeks.    Other migraine without status migrainosus, intractable  Head lump    Ultrasound order still in place to evaluate lump on head which I believe may be cystic in nature.  We did discuss migraine medication.  Patient feels that his headaches have improved and would like to hold off on medication at this time.    Raynaud's disease without gangrene    Discussed keeping digits warm to help decrease symptoms.  Answered patient's questions.        Return in about 6 weeks (around 11/22/2023), or if symptoms worsen or fail to improve, for 6-8 weeks labs.    Please note that this dictation was created using voice recognition software. I have made every reasonable attempt to correct obvious errors, but expect that there are errors of grammar and possible content that I did not discover before finalizing note.

## 2023-11-17 ENCOUNTER — APPOINTMENT (OUTPATIENT)
Dept: NEUROLOGY | Facility: MEDICAL CENTER | Age: 38
End: 2023-11-17
Attending: PSYCHIATRY & NEUROLOGY
Payer: COMMERCIAL

## 2024-01-31 ENCOUNTER — NON-PROVIDER VISIT (OUTPATIENT)
Dept: URGENT CARE | Facility: CLINIC | Age: 39
End: 2024-01-31

## 2024-01-31 DIAGNOSIS — Z02.1 PRE-EMPLOYMENT DRUG SCREENING: ICD-10-CM

## 2024-01-31 LAB
AMP AMPHETAMINE: NORMAL
COC COCAINE: NORMAL
INT CON NEG: NORMAL
INT CON POS: NORMAL
MET METHAMPHETAMINES: NORMAL
OPI OPIATES: NORMAL
PCP PHENCYCLIDINE: NORMAL
POC DRUG COMMENT 753798-OCCUPATIONAL HEALTH: NORMAL
THC: NORMAL

## 2024-01-31 PROCEDURE — 80305 DRUG TEST PRSMV DIR OPT OBS: CPT | Performed by: NURSE PRACTITIONER
